# Patient Record
Sex: FEMALE | Race: BLACK OR AFRICAN AMERICAN | Employment: FULL TIME | URBAN - METROPOLITAN AREA
[De-identification: names, ages, dates, MRNs, and addresses within clinical notes are randomized per-mention and may not be internally consistent; named-entity substitution may affect disease eponyms.]

---

## 2019-05-14 ENCOUNTER — APPOINTMENT (OUTPATIENT)
Dept: GENERAL RADIOLOGY | Age: 29
End: 2019-05-14

## 2019-05-14 ENCOUNTER — HOSPITAL ENCOUNTER (EMERGENCY)
Age: 29
Discharge: HOME | End: 2019-05-14

## 2019-05-14 VITALS
RESPIRATION RATE: 16 BRPM | BODY MASS INDEX: 40.71 KG/M2 | OXYGEN SATURATION: 97 % | DIASTOLIC BLOOD PRESSURE: 47 MMHG | SYSTOLIC BLOOD PRESSURE: 115 MMHG | WEIGHT: 252.21 LBS | TEMPERATURE: 97.6 F | HEART RATE: 88 BPM

## 2019-05-14 DIAGNOSIS — S61.214A LACERATION OF RIGHT RING FINGER WITHOUT FOREIGN BODY WITHOUT DAMAGE TO NAIL, INITIAL ENCOUNTER: ICD-10-CM

## 2019-05-14 DIAGNOSIS — S61.212A LACERATION OF RIGHT MIDDLE FINGER WITHOUT FOREIGN BODY WITHOUT DAMAGE TO NAIL, INITIAL ENCOUNTER: Primary | ICD-10-CM

## 2019-05-14 PROCEDURE — 73130 X-RAY EXAM OF HAND: CPT

## 2019-05-14 PROCEDURE — 4500000023 HC ED LEVEL 3 PROCEDURE

## 2019-05-14 PROCEDURE — 99283 EMERGENCY DEPT VISIT LOW MDM: CPT

## 2019-05-14 PROCEDURE — 2500000003 HC RX 250 WO HCPCS: Performed by: NURSE PRACTITIONER

## 2019-05-14 RX ORDER — BUPIVACAINE HYDROCHLORIDE 5 MG/ML
10 INJECTION, SOLUTION PERINEURAL ONCE
Status: COMPLETED | OUTPATIENT
Start: 2019-05-14 | End: 2019-05-14

## 2019-05-14 RX ORDER — IBUPROFEN 800 MG/1
800 TABLET ORAL EVERY 8 HOURS PRN
Qty: 30 TABLET | Refills: 0 | Status: SHIPPED | OUTPATIENT
Start: 2019-05-14

## 2019-05-14 RX ORDER — HYDROCODONE BITARTRATE AND ACETAMINOPHEN 5; 325 MG/1; MG/1
1 TABLET ORAL EVERY 6 HOURS PRN
Qty: 10 TABLET | Refills: 0 | Status: SHIPPED | OUTPATIENT
Start: 2019-05-14 | End: 2019-05-17

## 2019-05-14 RX ADMIN — BUPIVACAINE HYDROCHLORIDE 50 MG: 5 INJECTION, SOLUTION PERINEURAL at 21:09

## 2019-05-14 ASSESSMENT — PAIN SCALES - GENERAL
PAINLEVEL_OUTOF10: 2
PAINLEVEL_OUTOF10: 2

## 2019-05-14 ASSESSMENT — PAIN DESCRIPTION - LOCATION: LOCATION: FINGER (COMMENT WHICH ONE)

## 2019-05-14 ASSESSMENT — PAIN DESCRIPTION - PAIN TYPE: TYPE: ACUTE PAIN

## 2019-05-14 ASSESSMENT — PAIN DESCRIPTION - ORIENTATION: ORIENTATION: RIGHT

## 2019-05-15 ENCOUNTER — TELEPHONE (OUTPATIENT)
Dept: ORTHOPEDIC SURGERY | Age: 29
End: 2019-05-15

## 2019-05-15 NOTE — TELEPHONE ENCOUNTER
Pt is calling to seen when to come in for a    Laceration of right middle finger without foreign body no damage to nail and laceration of right ring finger no damage to that nail either,happen last night 5/14   Would like [place]   Please advise/

## 2019-05-15 NOTE — ED PROVIDER NOTES
**EVALUATED BY ADVANCED PRACTICE PROVIDER**        [address]      Pt Name: Hugo Lara  OKK:[id number]  Slim 1990  Date of evaluation: 2019  Provider: SHANE Gil CNP      Chief Complaint:    Chief Complaint   Patient presents with    Laceration     laceration to right third and fourth finger from broken glass table. Nursing Notes, Past Medical Hx, Past Surgical Hx, Social Hx, Allergies, and Family Hx were all reviewed and agreed with or any disagreements were addressed in the HPI.    HPI:  (Location, Duration, Timing, Severity,Quality, Assoc Sx, Context, Modifying factors)  This is a  33 y.o. female who presents to the emergency department today complaining of lacerations to the third and fourth digit of her right hand. Onset was just prior to arrival.  The context was she was cleaning her glass table when the glass broke. No numbness or tingling. Bleeding is controlled. Tetanus status up-to-date. She rates the pain 2/10. PastMedical/Surgical History:  History reviewed. No pertinent past medical history. Procedure Laterality Date     SECTION      x1     SECTION         Medications:  Previous Medications    ACETAMINOPHEN (APAP EXTRA STRENGTH) 500 MG TABLET    Take 2 tablets by mouth every 6 hours as needed for Pain         Review of Systems:  Review of Systems   Constitutional: Negative for diaphoresis. Musculoskeletal: Positive for arthralgias and joint swelling (fingers). Skin: Positive for wound (laceration). Allergic/Immunologic: Negative for immunocompromised state. Neurological: Negative for weakness and numbness. All other systems reviewed and are negative. Positives and Pertinent negatives as per HPI. Except as noted above in the ROS, problem specific ROS was completed and is negative.     Physical Exam:  Physical Exam   Constitutional: She is oriented to person, place, and time. Vital signs are normal. She appears well-developed and well-nourished. Non-toxic appearance. No distress. HENT:   Head: Normocephalic and atraumatic. Eyes: Conjunctivae are normal. No scleral icterus. Neck: Normal range of motion. No JVD present. Pulmonary/Chest: Effort normal. No respiratory distress. Musculoskeletal: Normal range of motion. Right hand: She exhibits tenderness, laceration and swelling. Hands:  Laceration on the palmar aspect of the distal third and fourth digits on the right hand. Bleeding is controlled. Distal extremities are pink and well perfused. Capillary refill less than 2 seconds. Sensation is intact with discrimination. Extensor and flexor tendons are intact with 5/5 strength. Digital blocks were performed on both fingers after the fingers were cleaned. The lacerations were then scrubbed and irrigated. There was no foreign body, bone, or tendon exposed. A total of 15 sutures were placed with good approximation of wound edges. Tourniquets were cut and there was no bleeding. Dry sterile dressings were applied. Patient remained neurovascularly intact without any deficits. Neurological: She is alert and oriented to person, place, and time. She has normal strength. No cranial nerve deficit or sensory deficit. Skin: Skin is warm and dry. Capillary refill takes less than 2 seconds. Psychiatric: She has a normal mood and affect. Nursing note and vitals reviewed. MEDICAL DECISION MAKING    Vitals:    Vitals:    05/14/19 2035   BP: (!) 115/47   Pulse: 88   Resp: 16   Temp: 97.6 °F (36.4 °C)   TempSrc: Temporal   SpO2: 97%   Weight: 252 lb 3.3 oz (114.4 kg)       LABS:Labs Reviewed - No data to display     Remainder of labs reviewed and werenegative at this time or not returned at the time of this note.     RADIOLOGY:   Non-plain film images such as CT, Ultrasound and MRI are read by the radiologist. SHANE Winslow - [facility] have directly visualized the radiologic plain film image(s) with the below findings:        Interpretation per the Radiologist below, if available at the time of thisnote:    XR HAND RIGHT (MIN 3 VIEWS)    (Results Pending)        No results found. MEDICAL DECISION MAKING / ED COURSE:      PROCEDURES:   Lac Repair  Date/Time: 5/14/2019 9:47 PM  Performed by: SHANE Islas CNP  Authorized by: SHANE Islas CNP     Consent:     Consent obtained:  Verbal    Consent given by:  Patient    Risks discussed:  Infection, tendon damage, poor cosmetic result and vascular damage  Anesthesia (see MAR for exact dosages): Anesthesia method:  Local infiltration    Local anesthetic:  Bupivacaine 0.5% w/o epi  Laceration details:     Location:  Finger    Finger location:  R long finger    Length (cm):  2.5  Repair type:     Repair type:  Simple  Pre-procedure details:     Preparation:  Patient was prepped and draped in usual sterile fashion and imaging obtained to evaluate for foreign bodies  Exploration:     Hemostasis achieved with:  Direct pressure    Wound exploration: wound explored through full range of motion and entire depth of wound probed and visualized      Contaminated: no    Treatment:     Wound cleansed with: Chlorhexidine. Amount of cleaning:  Extensive  Skin repair:     Repair method:  Sutures    Suture size:  6-0    Suture material:  Nylon    Suture technique:  Simple interrupted    Number of sutures:  8  Approximation:     Approximation:  Close    Vermilion border: well-aligned    Post-procedure details:     Dressing:  Sterile dressing    Patient tolerance of procedure:   Tolerated well, no immediate complications  Lac Repair  Date/Time: 5/14/2019 9:48 PM  Performed by: SHANE Islas CNP  Authorized by: SHANE Islas CNP     Consent:     Consent obtained:  Verbal    Consent given by:  Patient    Risks discussed:  Infection, pain, tendon damage and vascular damage  Anesthesia (see MAR for exact dosages): Anesthesia method:  Local infiltration    Local anesthetic:  Bupivacaine 0.5% w/o epi  Laceration details:     Location:  Finger    Finger location:  R ring finger    Length (cm):  2  Repair type:     Repair type:  Simple  Pre-procedure details:     Preparation:  Patient was prepped and draped in usual sterile fashion and imaging obtained to evaluate for foreign bodies  Exploration:     Hemostasis achieved with:  Direct pressure    Wound exploration: wound explored through full range of motion and entire depth of wound probed and visualized      Contaminated: no    Treatment:     Wound cleansed with: Chlorhexidine. Amount of cleaning:  Extensive  Skin repair:     Repair method:  Sutures    Suture size:  6-0    Suture material:  Nylon    Suture technique:  Simple interrupted    Number of sutures:  7  Approximation:     Approximation:  Close    Vermilion border: well-aligned    Post-procedure details:     Dressing:  Sterile dressing    Patient tolerance of procedure: Tolerated well, no immediate complications            Patient was given:  Medications   bupivacaine (MARCAINE) 0.5 % injection 50 mg (50 mg Intradermal Given by Other 5/14/19 2109)       Differential diagnosis: Tendon laceration, neurologic injury, vascular injury, involvement of bone that could lead to osteomyelitis, retained foreign body, delayed bacterial skin infection, other    Patient presents with laceration. See HPI for full presentation. Physical exam as above. Well-appearing 52-year-old female lying in bed in no acute distress. Laceration on the palmar aspect of the distal third and fourth digits on the right hand. Bleeding is controlled. Distal extremities are pink and well perfused. Capillary refill less than 2 seconds. Sensation is intact with discrimination. Extensor and flexor tendons are intact with 5/5 strength.   Digital blocks were performed on both fingers after the fingers were cleaned. The lacerations were then scrubbed and irrigated. There was no foreign body, bone, or tendon exposed. A total of 15 sutures were placed with good approximation of wound edges. Tourniquets were cut and there was no bleeding. Dry sterile dressings were applied. Patient remained neurovascularly intact without any deficits. Patient was given pain medication and referral to orthopedic hand surgery. I advised to return immediately for any signs of infection. The patient tolerated their visit well. I evaluated the patient. The physician was available for consultation as needed. The patient and / or the family were informed of the results of anytests, a time was given to answer questions, a plan was proposed and they agreed with plan. CLINICAL IMPRESSION:  1. Laceration of right middle finger without foreign body without damage to nail, initial encounter    2. Laceration of right ring finger without foreign body without damage to nail, initial encounter        DISPOSITION Decision To Discharge 05/14/2019 09:35:58 PM      PATIENT REFERRED TO:  [facility]) Pre-Services  [phone]  Call       Art Beck MD  [facility] [address] [place] [address]  [phone]    Schedule an appointment as soon as possible for a visit       [facility] Emergency Department  [address] [address]  [phone]  Go to   As needed      DISCHARGE MEDICATIONS:  New Prescriptions    HYDROCODONE-ACETAMINOPHEN (NORCO) 5-325 MG PER TABLET    Take 1 tablet by mouth every 6 hours as needed for Pain for up to 3 days.     IBUPROFEN (ADVIL;MOTRIN) 800 MG TABLET    Take 1 tablet by mouth every 8 hours as needed for Pain       DISCONTINUED MEDICATIONS:  Discontinued Medications    IBUPROFEN (ADVIL;MOTRIN) 600 MG TABLET    Take 1 tablet by mouth every 6 hours as needed for Pain              (Please note the MDM and HPI sections of this note were completed with a

## 2019-05-15 NOTE — ED NOTES
Dc instructions completed with pt. Pt verbalized understanding. Pt was ambulatory to exit.       Ishmael Mercado RN  05/14/19 4263

## 2019-05-15 NOTE — TELEPHONE ENCOUNTER
Called pt gave her next Friday at 10:00 told 9:30  Pt was told to take of her bandages and wash her hands and leave them air out. Is this right? Please advise.

## 2019-05-24 ENCOUNTER — OFFICE VISIT (OUTPATIENT)
Dept: ORTHOPEDIC SURGERY | Age: 29
End: 2019-05-24

## 2019-05-24 VITALS
RESPIRATION RATE: 16 BRPM | SYSTOLIC BLOOD PRESSURE: 129 MMHG | WEIGHT: 252.21 LBS | BODY MASS INDEX: 40.53 KG/M2 | HEART RATE: 81 BPM | HEIGHT: 66 IN | DIASTOLIC BLOOD PRESSURE: 75 MMHG

## 2019-05-24 DIAGNOSIS — S61.214A LACERATION OF RIGHT RING FINGER WITHOUT FOREIGN BODY WITHOUT DAMAGE TO NAIL, INITIAL ENCOUNTER: Primary | ICD-10-CM

## 2019-05-24 PROCEDURE — 99203 OFFICE O/P NEW LOW 30 MIN: CPT | Performed by: ORTHOPAEDIC SURGERY

## 2019-05-24 NOTE — PROGRESS NOTES
treatment. We discussed the possibility of residual symptoms as may be related to non-operative treatment of the lacerated finger, including the possiblities of: infection, poor healing of skin, and other involved structures, persistant deformity, persistant pain, limitation of motion, future arthritic symptoms & the possible need for further treatment. She understood our discussion and was comfortable with her decision; she was provided with appropriate expectations. She is today fitted with a carefully applied finger dressing and given instruction in dressing changes. She was instructed in proper wound care and maintenance. I have asked her to schedule a follow-up appointment for 1 week from now for re-evaluation & suture removal    She is specifically instructed to contact the office between now & her scheduled appointment if she has concerns related to the injured finger. She is welcome to call for an appointment sooner if she has any additional concerns or questions.